# Patient Record
Sex: MALE | Race: BLACK OR AFRICAN AMERICAN | Employment: UNEMPLOYED | URBAN - METROPOLITAN AREA
[De-identification: names, ages, dates, MRNs, and addresses within clinical notes are randomized per-mention and may not be internally consistent; named-entity substitution may affect disease eponyms.]

---

## 2017-08-08 ENCOUNTER — TELEPHONE (OUTPATIENT)
Dept: FAMILY MEDICINE CLINIC | Facility: CLINIC | Age: 47
End: 2017-08-08

## 2017-08-08 DIAGNOSIS — Z13.0 SCREENING FOR ENDOCRINE, METABOLIC AND IMMUNITY DISORDER: ICD-10-CM

## 2017-08-08 DIAGNOSIS — Z13.29 SCREENING FOR ENDOCRINE, METABOLIC AND IMMUNITY DISORDER: ICD-10-CM

## 2017-08-08 DIAGNOSIS — Z13.220 SCREENING, LIPID: ICD-10-CM

## 2017-08-08 DIAGNOSIS — Z12.5 SCREENING PSA (PROSTATE SPECIFIC ANTIGEN): ICD-10-CM

## 2017-08-08 DIAGNOSIS — Z13.228 SCREENING FOR ENDOCRINE, METABOLIC AND IMMUNITY DISORDER: ICD-10-CM

## 2017-08-08 DIAGNOSIS — Z13.0 SCREENING, ANEMIA, DEFICIENCY, IRON: Primary | ICD-10-CM

## 2017-08-09 LAB
ABSOLUTE BASOPHILS: 28 CELLS/UL (ref 0–200)
ABSOLUTE EOSINOPHILS: 71 CELLS/UL (ref 15–500)
ABSOLUTE LYMPHOCYTES: 2204 CELLS/UL (ref 850–3900)
ABSOLUTE MONOCYTES: 353 CELLS/UL (ref 200–950)
ABSOLUTE NEUTROPHILS: 2045 CELLS/UL (ref 1500–7800)
ALBUMIN/GLOBULIN RATIO: 1.4 (CALC) (ref 1–2.5)
ALBUMIN: 4.1 G/DL (ref 3.6–5.1)
ALKALINE PHOSPHATASE: 56 U/L (ref 40–115)
ALT: 12 U/L (ref 9–46)
AST: 13 U/L (ref 10–40)
BASOPHILS: 0.6 %
BILIRUBIN, TOTAL: 1 MG/DL (ref 0.2–1.2)
BUN: 20 MG/DL (ref 7–25)
CALCIUM: 9.7 MG/DL (ref 8.6–10.3)
CARBON DIOXIDE: 25 MMOL/L (ref 20–31)
CHLORIDE: 106 MMOL/L (ref 98–110)
CHOL/HDLC RATIO: 3.4 (CALC)
CHOLESTEROL, TOTAL: 225 MG/DL (ref 125–200)
CREATININE: 1.11 MG/DL (ref 0.6–1.35)
EGFR IF AFRICN AM: 91 ML/MIN/1.73M2
EGFR IF NONAFRICN AM: 79 ML/MIN/1.73M2
EOSINOPHILS: 1.5 %
GLOBULIN: 2.9 G/DL (CALC) (ref 1.9–3.7)
GLUCOSE: 91 MG/DL (ref 65–99)
HDL CHOLESTEROL: 67 MG/DL
HEMATOCRIT: 41.1 % (ref 38.5–50)
HEMOGLOBIN: 13.8 G/DL (ref 13.2–17.1)
LDL-CHOLESTEROL: 142 MG/DL (CALC)
LYMPHOCYTES: 46.9 %
MCH: 30.7 PG (ref 27–33)
MCHC: 33.6 G/DL (ref 32–36)
MCV: 91.5 FL (ref 80–100)
MONOCYTES: 7.5 %
MPV: 11.2 FL (ref 7.5–12.5)
NEUTROPHILS: 43.5 %
NON-HDL CHOLESTEROL: 158 MG/DL (CALC)
PLATELET COUNT: 195 THOUSAND/UL (ref 140–400)
POTASSIUM: 4.1 MMOL/L (ref 3.5–5.3)
PROTEIN, TOTAL: 7 G/DL (ref 6.1–8.1)
PSA, TOTAL: 5.5 NG/ML
RDW: 11.7 % (ref 11–15)
RED BLOOD CELL COUNT: 4.49 MILLION/UL (ref 4.2–5.8)
SODIUM: 141 MMOL/L (ref 135–146)
TRIGLYCERIDES: 78 MG/DL
WHITE BLOOD CELL COUNT: 4.7 THOUSAND/UL (ref 3.8–10.8)

## 2017-08-11 ENCOUNTER — OFFICE VISIT (OUTPATIENT)
Dept: FAMILY MEDICINE CLINIC | Facility: CLINIC | Age: 47
End: 2017-08-11

## 2017-08-11 VITALS
BODY MASS INDEX: 26.06 KG/M2 | DIASTOLIC BLOOD PRESSURE: 78 MMHG | SYSTOLIC BLOOD PRESSURE: 128 MMHG | HEIGHT: 67.5 IN | HEART RATE: 60 BPM | WEIGHT: 168 LBS | TEMPERATURE: 99 F

## 2017-08-11 DIAGNOSIS — Z00.00 ANNUAL PHYSICAL EXAM: Primary | ICD-10-CM

## 2017-08-11 DIAGNOSIS — R97.20 ELEVATED PSA, LESS THAN 10 NG/ML: ICD-10-CM

## 2017-08-11 DIAGNOSIS — E78.00 PURE HYPERCHOLESTEROLEMIA: ICD-10-CM

## 2017-08-11 PROCEDURE — 99396 PREV VISIT EST AGE 40-64: CPT | Performed by: FAMILY MEDICINE

## 2017-08-11 NOTE — PROGRESS NOTES
No chief complaint on file. HPI:   Bianka Griffin is a 52year old male who presents for a complete physical exam. No concerns today. On and off bloating. Last colonoscopy:  n/a  Last PSA:  Getting checked. Sees urology. PSA stable in the 5s. mouth daily.  Disp:  Rfl:    hydrocortisone (PROCTOCREAM HC) 2.5 % Rectal Cream Apply twice daily for seven days then BID PRN Disp: 30 g Rfl: 0      Past Medical History:   Diagnosis Date   • Rectal bleed       Past Surgical History:  No date: COLONOSCOPY normal, atraumatic, no cyanosis or edema  Pulses: 2+ and symmetric  Neurologic: Alert and oriented X 3, normal strength and tone. Normal symmetric reflexes.  Normal coordination and gait      ASSESSMENT AND PLAN:     Michael Huddleston was seen in the office

## 2017-09-20 ENCOUNTER — OFFICE VISIT (OUTPATIENT)
Dept: FAMILY MEDICINE CLINIC | Facility: CLINIC | Age: 47
End: 2017-09-20

## 2017-09-20 VITALS
BODY MASS INDEX: 25.91 KG/M2 | WEIGHT: 167 LBS | RESPIRATION RATE: 12 BRPM | HEIGHT: 67.5 IN | DIASTOLIC BLOOD PRESSURE: 72 MMHG | HEART RATE: 68 BPM | TEMPERATURE: 99 F | SYSTOLIC BLOOD PRESSURE: 112 MMHG

## 2017-09-20 DIAGNOSIS — R14.0 ABDOMINAL BLOATING: Primary | ICD-10-CM

## 2017-09-20 PROCEDURE — 99213 OFFICE O/P EST LOW 20 MIN: CPT | Performed by: FAMILY MEDICINE

## 2017-09-20 NOTE — PROGRESS NOTES
Patient presents with:  Bloating: x 1 month      HPI:   Delia Rick is a 52year old male who presents to the office for abdominal bloating. This started about a month and a half ago. No clear cause. HE has cut down his powdered protein.   Changed

## 2019-11-22 ENCOUNTER — OFFICE VISIT (OUTPATIENT)
Dept: FAMILY MEDICINE CLINIC | Facility: CLINIC | Age: 49
End: 2019-11-22

## 2019-11-22 VITALS
DIASTOLIC BLOOD PRESSURE: 70 MMHG | HEIGHT: 67.5 IN | BODY MASS INDEX: 25.01 KG/M2 | RESPIRATION RATE: 12 BRPM | TEMPERATURE: 99 F | SYSTOLIC BLOOD PRESSURE: 114 MMHG | WEIGHT: 161.25 LBS | HEART RATE: 100 BPM

## 2019-11-22 DIAGNOSIS — F32.A DEPRESSION, UNSPECIFIED DEPRESSION TYPE: Primary | ICD-10-CM

## 2019-11-22 DIAGNOSIS — G47.9 DIFFICULTY SLEEPING: ICD-10-CM

## 2019-11-22 PROCEDURE — 99213 OFFICE O/P EST LOW 20 MIN: CPT | Performed by: NURSE PRACTITIONER

## 2019-11-22 RX ORDER — ESCITALOPRAM OXALATE 10 MG/1
10 TABLET ORAL DAILY
Qty: 30 TABLET | Refills: 1 | Status: SHIPPED | OUTPATIENT
Start: 2019-11-22 | End: 2020-02-18

## 2019-11-22 NOTE — PROGRESS NOTES
Patient presents with:  Sleep Problem: x 3 weeks, not sleeping at night, stress       HPI:  Presents with approx 3 week history of feeling depressed and having difficulty sleeping.  Stated feels feelings of depression are related to marital stress with wife distress. Effort normal. Breath sounds clear bilaterally. No wheezes, rhonchi or rales   Skin: Skin is warm and dry. No rash noted. No erythema. No pallor. Psych: Sitting calmly in exam room, interacting appropriately with examiner.  Dressed appropriately

## 2020-01-07 ENCOUNTER — TELEPHONE (OUTPATIENT)
Dept: FAMILY MEDICINE CLINIC | Facility: CLINIC | Age: 50
End: 2020-01-07

## 2020-01-07 DIAGNOSIS — Z13.220 LIPID SCREENING: Primary | ICD-10-CM

## 2020-01-07 DIAGNOSIS — Z13.0 SCREENING FOR DEFICIENCY ANEMIA: ICD-10-CM

## 2020-01-07 NOTE — TELEPHONE ENCOUNTER
Please enter lab orders for the patient's upcoming physical appointment. Physical scheduled:    Your appointments     Date & Time Appointment Department [facility])    Jan 14, 2020  2:00 PM CST Physical - Established with Uriel Hamilton MD [facility]

## 2020-01-11 LAB
ABSOLUTE BASOPHILS: 19 CELLS/UL (ref 0–200)
ABSOLUTE EOSINOPHILS: 53 CELLS/UL (ref 15–500)
ABSOLUTE LYMPHOCYTES: 1421 CELLS/UL (ref 850–3900)
ABSOLUTE MONOCYTES: 322 CELLS/UL (ref 200–950)
ABSOLUTE NEUTROPHILS: 2986 CELLS/UL (ref 1500–7800)
ALBUMIN/GLOBULIN RATIO: 1.4 (CALC) (ref 1–2.5)
ALBUMIN: 3.8 G/DL (ref 3.6–5.1)
ALKALINE PHOSPHATASE: 54 U/L (ref 40–115)
ALT: 9 U/L (ref 9–46)
AST: 10 U/L (ref 10–40)
BASOPHILS: 0.4 %
BILIRUBIN, TOTAL: 0.8 MG/DL (ref 0.2–1.2)
BUN: 16 MG/DL (ref 7–25)
CALCIUM: 9.4 MG/DL (ref 8.6–10.3)
CARBON DIOXIDE: 27 MMOL/L (ref 20–32)
CHLORIDE: 107 MMOL/L (ref 98–110)
CHOL/HDLC RATIO: 3.7 (CALC)
CHOLESTEROL, TOTAL: 212 MG/DL
CREATININE: 1.02 MG/DL (ref 0.6–1.35)
EGFR IF AFRICN AM: 100 ML/MIN/1.73M2
EGFR IF NONAFRICN AM: 86 ML/MIN/1.73M2
EOSINOPHILS: 1.1 %
GLOBULIN: 2.8 G/DL (CALC) (ref 1.9–3.7)
GLUCOSE: 89 MG/DL (ref 65–99)
HDL CHOLESTEROL: 57 MG/DL
HEMATOCRIT: 43.4 % (ref 38.5–50)
HEMOGLOBIN: 14.5 G/DL (ref 13.2–17.1)
LDL-CHOLESTEROL: 139 MG/DL (CALC)
LYMPHOCYTES: 29.6 %
MCH: 30.9 PG (ref 27–33)
MCHC: 33.4 G/DL (ref 32–36)
MCV: 92.3 FL (ref 80–100)
MONOCYTES: 6.7 %
MPV: 10.7 FL (ref 7.5–12.5)
NEUTROPHILS: 62.2 %
NON-HDL CHOLESTEROL: 155 MG/DL (CALC)
PLATELET COUNT: 274 THOUSAND/UL (ref 140–400)
POTASSIUM: 4.4 MMOL/L (ref 3.5–5.3)
PROTEIN, TOTAL: 6.6 G/DL (ref 6.1–8.1)
RDW: 11.6 % (ref 11–15)
RED BLOOD CELL COUNT: 4.7 MILLION/UL (ref 4.2–5.8)
SODIUM: 141 MMOL/L (ref 135–146)
TRIGLYCERIDES: 67 MG/DL
WHITE BLOOD CELL COUNT: 4.8 THOUSAND/UL (ref 3.8–10.8)

## 2020-01-14 ENCOUNTER — OFFICE VISIT (OUTPATIENT)
Dept: FAMILY MEDICINE CLINIC | Facility: CLINIC | Age: 50
End: 2020-01-14

## 2020-01-14 VITALS
SYSTOLIC BLOOD PRESSURE: 116 MMHG | WEIGHT: 153.81 LBS | HEART RATE: 68 BPM | TEMPERATURE: 99 F | DIASTOLIC BLOOD PRESSURE: 74 MMHG | BODY MASS INDEX: 23.86 KG/M2 | HEIGHT: 67.5 IN | RESPIRATION RATE: 14 BRPM

## 2020-01-14 DIAGNOSIS — Z00.00 ANNUAL PHYSICAL EXAM: Primary | ICD-10-CM

## 2020-01-14 DIAGNOSIS — R97.20 ELEVATED PSA, LESS THAN 10 NG/ML: ICD-10-CM

## 2020-01-14 DIAGNOSIS — Z12.11 COLON CANCER SCREENING: ICD-10-CM

## 2020-01-14 PROCEDURE — 99396 PREV VISIT EST AGE 40-64: CPT | Performed by: FAMILY MEDICINE

## 2020-01-14 NOTE — PROGRESS NOTES
Patient presents with: Well Adult: Annual physical     HPI:   Evette Holley is a 52year old male who presents for a complete physical exam.     Last colonoscopy:  Due at 48. Last PSA:  High in past.  Good urine flow. No night time syptoms.   No fam ENDOSCOPY   • HEMORRHOIDECTOMY N/A 3/28/2014    Performed by Adonis Lennon MD at [facility] MAIN OR      Family History   Problem Relation Age of Onset   • Hypertension Mother       Social History:  Social History    Tobacco Use      Smoking status: Never Smoker separation right now. Mild depression, coping ok so far. Offered counseling, therapy. Will consider. Theoplis Elder has been a good distraction for him of recent  Stay active, healthy diet. Recheck PSA.      2. Elevated PSA, less than 10 ng/ml  High three year

## 2020-02-18 ENCOUNTER — OFFICE VISIT (OUTPATIENT)
Dept: FAMILY MEDICINE CLINIC | Facility: CLINIC | Age: 50
End: 2020-02-18

## 2020-02-18 VITALS
WEIGHT: 154.5 LBS | SYSTOLIC BLOOD PRESSURE: 130 MMHG | BODY MASS INDEX: 23.97 KG/M2 | RESPIRATION RATE: 14 BRPM | HEART RATE: 84 BPM | DIASTOLIC BLOOD PRESSURE: 90 MMHG | TEMPERATURE: 98 F | HEIGHT: 67.32 IN

## 2020-02-18 DIAGNOSIS — R03.0 ELEVATED BP WITHOUT DIAGNOSIS OF HYPERTENSION: ICD-10-CM

## 2020-02-18 DIAGNOSIS — R42 DIZZINESS: Primary | ICD-10-CM

## 2020-02-18 DIAGNOSIS — R00.2 PALPITATIONS: ICD-10-CM

## 2020-02-18 DIAGNOSIS — F41.9 ANXIETY: ICD-10-CM

## 2020-02-18 PROCEDURE — 99214 OFFICE O/P EST MOD 30 MIN: CPT | Performed by: NURSE PRACTITIONER

## 2020-02-18 PROCEDURE — 93000 ELECTROCARDIOGRAM COMPLETE: CPT | Performed by: NURSE PRACTITIONER

## 2020-02-18 NOTE — PROGRESS NOTES
Patient presents with:  Dizziness: x 2 weeks, high bp at [employer], headachs, heart racing       HPI:  Presents with 2 week history of intermittent dizziness both at rest and with activity with occasional associated headache.  Stated has needed to sit down intact. Hand grasp (=) bilat. No pronator drift. No nystagmus noted. Follows commands appropriately. Speech fluid. Neck: Normal range of motion. Neck supple. No JVD/bruits  Cardiovascular: Normal rate, regular rhythm. No murmur. No S3/S4.    Pulmonary/Jacqueline other anxiety control techniques such as routine exercise- which he may resume at low/moderate intensity (ER if symptoms occur during exercise), meditation, guided imagery, yoga, writing in journal, talking to support network (family/friends), professional

## 2020-02-28 LAB
% FREE PSA: 15 % (CALC)
FREE PSA: 1 NG/ML
TOTAL PSA: 6.8 NG/ML

## 2020-06-23 ENCOUNTER — TELEPHONE (OUTPATIENT)
Dept: FAMILY MEDICINE CLINIC | Facility: CLINIC | Age: 50
End: 2020-06-23

## 2020-06-23 DIAGNOSIS — R97.20 ELEVATED PSA, LESS THAN 10 NG/ML: Primary | ICD-10-CM

## 2020-06-23 NOTE — TELEPHONE ENCOUNTER
Patient scheduled follow up appointment for 6/29 for follow up on PSA. Patient had psa levels checked 2/27 does he need another PSA lab order prior  To visit?  If so, please send order to [facility]. Appointment is scheduled for 6/29

## 2020-06-29 ENCOUNTER — OFFICE VISIT (OUTPATIENT)
Dept: FAMILY MEDICINE CLINIC | Facility: CLINIC | Age: 50
End: 2020-06-29

## 2020-06-29 VITALS
TEMPERATURE: 99 F | HEART RATE: 80 BPM | RESPIRATION RATE: 16 BRPM | WEIGHT: 160 LBS | HEIGHT: 67 IN | DIASTOLIC BLOOD PRESSURE: 70 MMHG | BODY MASS INDEX: 25.11 KG/M2 | SYSTOLIC BLOOD PRESSURE: 120 MMHG

## 2020-06-29 DIAGNOSIS — R97.20 ELEVATED PSA, LESS THAN 10 NG/ML: Primary | ICD-10-CM

## 2020-06-29 PROCEDURE — 99213 OFFICE O/P EST LOW 20 MIN: CPT | Performed by: FAMILY MEDICINE

## 2020-06-29 NOTE — PROGRESS NOTES
Patient presents with:  elevated psa: Review Test Results from 06-     HPI:   Wei Solis is a 48year old male who presents to the office for discussion of PSA. PSA kinsey. No urinary symptoms.       Has seen Dr. Hayde Butler in the past.  MRI in 2

## 2020-07-06 PROCEDURE — 88344 IMHCHEM/IMCYTCHM EA MLT ANTB: CPT | Performed by: UROLOGY

## 2020-07-06 PROCEDURE — 88305 TISSUE EXAM BY PATHOLOGIST: CPT | Performed by: UROLOGY

## 2020-08-21 ENCOUNTER — TELEPHONE (OUTPATIENT)
Dept: FAMILY MEDICINE CLINIC | Facility: CLINIC | Age: 50
End: 2020-08-21

## 2020-08-21 DIAGNOSIS — Z20.822 ENCOUNTER FOR SCREENING LABORATORY TESTING FOR COVID-19 VIRUS IN ASYMPTOMATIC PATIENT: Primary | ICD-10-CM

## 2020-08-21 NOTE — TELEPHONE ENCOUNTER
Patient is leaving for [place] on Wednesday and needs a covid test done and back before leaving

## 2020-08-21 NOTE — TELEPHONE ENCOUNTER
Patient is going to [facility] [place] and he needs a Covid test done before he enters the country.  No SX.

## 2020-08-22 ENCOUNTER — LAB ENCOUNTER (OUTPATIENT)
Dept: LAB | Facility: HOSPITAL | Age: 50
End: 2020-08-22
Attending: FAMILY MEDICINE
Payer: COMMERCIAL

## 2020-08-22 DIAGNOSIS — Z20.822 ENCOUNTER FOR SCREENING LABORATORY TESTING FOR COVID-19 VIRUS IN ASYMPTOMATIC PATIENT: ICD-10-CM

## 2020-08-26 LAB — SARS-COV-2 BY PCR: NOT DETECTED

## 2021-06-02 ENCOUNTER — TELEPHONE (OUTPATIENT)
Dept: FAMILY MEDICINE CLINIC | Facility: CLINIC | Age: 51
End: 2021-06-02

## 2021-06-02 DIAGNOSIS — R97.20 ELEVATED PSA, LESS THAN 10 NG/ML: Primary | ICD-10-CM

## 2021-06-02 DIAGNOSIS — E78.00 PURE HYPERCHOLESTEROLEMIA: ICD-10-CM

## 2021-06-02 NOTE — TELEPHONE ENCOUNTER
Please enter lab orders for the patient's upcoming physical appointment. Physical scheduled:    Your appointments     Date & Time Appointment Department [facility])    Jun 14, 2021 11:30 AM CDT Physical - Established with Nunu Flores  [facility]

## 2021-06-14 ENCOUNTER — OFFICE VISIT (OUTPATIENT)
Dept: FAMILY MEDICINE CLINIC | Facility: CLINIC | Age: 51
End: 2021-06-14
Payer: MEDICAID

## 2021-06-14 VITALS
BODY MASS INDEX: 26.37 KG/M2 | SYSTOLIC BLOOD PRESSURE: 120 MMHG | DIASTOLIC BLOOD PRESSURE: 68 MMHG | WEIGHT: 170 LBS | HEART RATE: 84 BPM | TEMPERATURE: 97 F | HEIGHT: 67.5 IN

## 2021-06-14 DIAGNOSIS — R97.20 ELEVATED PSA, LESS THAN 10 NG/ML: ICD-10-CM

## 2021-06-14 DIAGNOSIS — Z00.00 ANNUAL PHYSICAL EXAM: Primary | ICD-10-CM

## 2021-06-14 DIAGNOSIS — E78.00 PURE HYPERCHOLESTEROLEMIA: ICD-10-CM

## 2021-06-14 PROCEDURE — 99396 PREV VISIT EST AGE 40-64: CPT | Performed by: FAMILY MEDICINE

## 2021-06-14 PROCEDURE — 3078F DIAST BP <80 MM HG: CPT | Performed by: FAMILY MEDICINE

## 2021-06-14 PROCEDURE — 3008F BODY MASS INDEX DOCD: CPT | Performed by: FAMILY MEDICINE

## 2021-06-14 PROCEDURE — 3074F SYST BP LT 130 MM HG: CPT | Performed by: FAMILY MEDICINE

## 2021-06-14 NOTE — PROGRESS NOTES
Patient presents with:  Physical: Annual      HPI:   Mónica Pinon is a 46year old male who presents for a complete physical exam.     Last colonoscopy:  3/24/2014. Repeat 10 yrs  Last PSA:  PSA rising, but actually down a bit from previous testing. HEMORRHOIDECTOMY  3/28/2014    Procedure: Ana Smallwood;  Surgeon: Fiorella Lee MD;  Location: [address] [place] [address]   • OTHER SURGICAL HISTORY  07/06/2020    prostate biopsy, Dr Manolo Huff      Family History   Problem Relation Age of Onset   • Hypertension Mother       So PSA.    Colonoscopy up-to-date  Not interested in vaccinations at this time    2. Pure hypercholesterolemia  Cholesterol with quite a jump since the last check a few years ago.   Patient believes this can be attributed to his diet which has been higher in m

## 2021-11-29 ENCOUNTER — TELEPHONE (OUTPATIENT)
Dept: FAMILY MEDICINE CLINIC | Facility: CLINIC | Age: 51
End: 2021-11-29

## 2021-11-29 NOTE — TELEPHONE ENCOUNTER
Pt has scheduled an appt with Claudette Richards on 12/7/21 for back pain and also requesting a call back from a nurse

## 2021-11-30 NOTE — TELEPHONE ENCOUNTER
Called and talked to patient he has having the same pain that he had several years ago made appointmetn for tomorrow.

## 2021-12-01 ENCOUNTER — OFFICE VISIT (OUTPATIENT)
Dept: FAMILY MEDICINE CLINIC | Facility: CLINIC | Age: 51
End: 2021-12-01
Payer: MEDICAID

## 2021-12-01 VITALS
SYSTOLIC BLOOD PRESSURE: 130 MMHG | WEIGHT: 170.63 LBS | DIASTOLIC BLOOD PRESSURE: 82 MMHG | HEIGHT: 67.25 IN | TEMPERATURE: 98 F | BODY MASS INDEX: 26.47 KG/M2 | RESPIRATION RATE: 16 BRPM | HEART RATE: 68 BPM

## 2021-12-01 DIAGNOSIS — S39.012A STRAIN OF LUMBAR REGION, INITIAL ENCOUNTER: Primary | ICD-10-CM

## 2021-12-01 PROCEDURE — 3079F DIAST BP 80-89 MM HG: CPT | Performed by: PHYSICIAN ASSISTANT

## 2021-12-01 PROCEDURE — 3075F SYST BP GE 130 - 139MM HG: CPT | Performed by: PHYSICIAN ASSISTANT

## 2021-12-01 PROCEDURE — 3008F BODY MASS INDEX DOCD: CPT | Performed by: PHYSICIAN ASSISTANT

## 2021-12-01 PROCEDURE — 99213 OFFICE O/P EST LOW 20 MIN: CPT | Performed by: PHYSICIAN ASSISTANT

## 2021-12-01 RX ORDER — CYCLOBENZAPRINE HCL 5 MG
5 TABLET ORAL NIGHTLY PRN
Qty: 20 TABLET | Refills: 0 | Status: SHIPPED | OUTPATIENT
Start: 2021-12-01

## 2021-12-01 RX ORDER — METHYLPREDNISOLONE 4 MG/1
TABLET ORAL
Qty: 21 EACH | Refills: 0 | Status: SHIPPED | OUTPATIENT
Start: 2021-12-01

## 2021-12-06 NOTE — PROGRESS NOTES
Patient presents with:  Back Pain: Low back pain x1 week, pt feels a burning sensation       HISTORY OF PRESENT ILLNESS  Westramos Song is a 46year old male who presents for evaluation of low back pain.  He has had this happen in the past but typ Normal gait. SENSATION Normal  PULSES 2+ posterior tibial pulses bilaterally. ASSESSMENT/ PLAN  (S39.012A) Strain of lumbar region, initial encounter  (primary encounter diagnosis)  Plan: Will have him try medrol and flexeril.  Discussed possible side

## 2021-12-20 ENCOUNTER — TELEPHONE (OUTPATIENT)
Dept: FAMILY MEDICINE CLINIC | Facility: CLINIC | Age: 51
End: 2021-12-20

## 2021-12-20 NOTE — TELEPHONE ENCOUNTER
Pt no showed his appt for today.  Pt has been informed of $40.00 and did reschedule to 1/3/22 with Dr Aiden Sanchez

## 2022-02-11 ENCOUNTER — OFFICE VISIT (OUTPATIENT)
Dept: FAMILY MEDICINE CLINIC | Facility: CLINIC | Age: 52
End: 2022-02-11
Payer: MEDICAID

## 2022-02-11 VITALS
BODY MASS INDEX: 25.97 KG/M2 | RESPIRATION RATE: 16 BRPM | HEART RATE: 64 BPM | HEIGHT: 68 IN | WEIGHT: 171.38 LBS | DIASTOLIC BLOOD PRESSURE: 74 MMHG | TEMPERATURE: 98 F | SYSTOLIC BLOOD PRESSURE: 112 MMHG | OXYGEN SATURATION: 99 %

## 2022-02-11 DIAGNOSIS — R37 SEXUAL DYSFUNCTION: ICD-10-CM

## 2022-02-11 DIAGNOSIS — R97.20 ELEVATED PSA, LESS THAN 10 NG/ML: Primary | ICD-10-CM

## 2022-02-11 PROCEDURE — 3078F DIAST BP <80 MM HG: CPT | Performed by: FAMILY MEDICINE

## 2022-02-11 PROCEDURE — 99213 OFFICE O/P EST LOW 20 MIN: CPT | Performed by: FAMILY MEDICINE

## 2022-02-11 PROCEDURE — 3074F SYST BP LT 130 MM HG: CPT | Performed by: FAMILY MEDICINE

## 2022-02-11 PROCEDURE — 3008F BODY MASS INDEX DOCD: CPT | Performed by: FAMILY MEDICINE

## 2022-07-11 ENCOUNTER — TELEPHONE (OUTPATIENT)
Dept: FAMILY MEDICINE CLINIC | Facility: CLINIC | Age: 52
End: 2022-07-11

## 2022-07-11 NOTE — TELEPHONE ENCOUNTER
Symptoms started Saturday and had positive home covid test Sunday. Currently has a cough, fever that mostly occurs at night (has not checked temp) and night sweats. Denies any breathing issues. Reviewed recommended quarantine time. Rest and push fluids. Discussed OTC meds. Taking Advil and allergy meds. May purchase a pulse oximeter. To call this office with questions or worsening. To UC or ER for severe or new symptoms.

## 2022-07-11 NOTE — TELEPHONE ENCOUNTER
Pt has tested positive for Covid on Saturday. He has sweating,chest congestion,headache and fever.  Please advise

## 2022-12-10 ENCOUNTER — TELEPHONE (OUTPATIENT)
Dept: FAMILY MEDICINE CLINIC | Facility: CLINIC | Age: 52
End: 2022-12-10

## 2022-12-10 DIAGNOSIS — E78.00 PURE HYPERCHOLESTEROLEMIA: Primary | ICD-10-CM

## 2022-12-10 DIAGNOSIS — R97.20 ELEVATED PSA, LESS THAN 10 NG/ML: ICD-10-CM

## 2022-12-10 NOTE — TELEPHONE ENCOUNTER
Please enter lab orders for the patient's upcoming physical appointment. Physical scheduled: Your appointments     Date & Time Appointment Department [facility])    Jan 24, 2023  3:00 PM CST Physical - Established with Gil Cotto MD [facility] [address], [address], [place]  ([place]ovany Zuleta)            [address] [place] [id number] [place] [address] [phone]         Preferred lab: [facility]     The patient has been notified to complete fasting labs prior to their physical appointment.

## 2022-12-21 ENCOUNTER — APPOINTMENT (OUTPATIENT)
Dept: CT IMAGING | Facility: HOSPITAL | Age: 52
End: 2022-12-21
Attending: INTERNAL MEDICINE
Payer: MEDICAID

## 2022-12-21 ENCOUNTER — HOSPITAL ENCOUNTER (EMERGENCY)
Facility: HOSPITAL | Age: 52
Discharge: HOME | End: 2022-12-21
Attending: EMERGENCY MEDICINE
Payer: MEDICAID

## 2022-12-21 ENCOUNTER — APPOINTMENT (OUTPATIENT)
Dept: CV DIAGNOSTICS | Facility: HOSPITAL | Age: 52
End: 2022-12-21
Attending: INTERNAL MEDICINE
Payer: MEDICAID

## 2022-12-21 ENCOUNTER — APPOINTMENT (OUTPATIENT)
Dept: GENERAL RADIOLOGY | Facility: HOSPITAL | Age: 52
End: 2022-12-21
Attending: EMERGENCY MEDICINE
Payer: MEDICAID

## 2022-12-21 VITALS
TEMPERATURE: 98 F | WEIGHT: 165 LBS | SYSTOLIC BLOOD PRESSURE: 128 MMHG | OXYGEN SATURATION: 99 % | DIASTOLIC BLOOD PRESSURE: 78 MMHG | HEART RATE: 75 BPM | RESPIRATION RATE: 14 BRPM | BODY MASS INDEX: 25.9 KG/M2 | HEIGHT: 67 IN

## 2022-12-21 DIAGNOSIS — E78.00 HYPERCHOLESTEROLEMIA: ICD-10-CM

## 2022-12-21 DIAGNOSIS — R07.89 CHEST PAIN, ATYPICAL: Primary | ICD-10-CM

## 2022-12-21 LAB
ALBUMIN SERPL-MCNC: 3.5 G/DL (ref 3.4–5)
ALBUMIN/GLOB SERPL: 1 {RATIO} (ref 1–2)
ALP LIVER SERPL-CCNC: 55 U/L
ALT SERPL-CCNC: 18 U/L
ANION GAP SERPL CALC-SCNC: 0 MMOL/L (ref 0–18)
APTT PPP: 25.6 SECONDS (ref 23.3–35.6)
AST SERPL-CCNC: 10 U/L (ref 15–37)
ATRIAL RATE: 61 BPM
BASOPHILS # BLD AUTO: 0.03 X10(3) UL (ref 0–0.2)
BASOPHILS NFR BLD AUTO: 0.6 %
BILIRUB SERPL-MCNC: 0.6 MG/DL (ref 0.1–2)
BILIRUB UR QL STRIP.AUTO: NEGATIVE
BUN BLD-MCNC: 15 MG/DL (ref 7–18)
CALCIUM BLD-MCNC: 8.8 MG/DL (ref 8.5–10.1)
CHLORIDE SERPL-SCNC: 111 MMOL/L (ref 98–112)
CHOLEST SERPL-MCNC: 231 MG/DL (ref ?–200)
CLARITY UR REFRACT.AUTO: CLEAR
CO2 SERPL-SCNC: 30 MMOL/L (ref 21–32)
COLOR UR AUTO: YELLOW
CREAT BLD-MCNC: 1.2 MG/DL
D DIMER PPP FEU-MCNC: <0.27 UG/ML FEU (ref ?–0.52)
EOSINOPHIL # BLD AUTO: 0.07 X10(3) UL (ref 0–0.7)
EOSINOPHIL NFR BLD AUTO: 1.4 %
ERYTHROCYTE [DISTWIDTH] IN BLOOD BY AUTOMATED COUNT: 11.9 %
GFR SERPLBLD BASED ON 1.73 SQ M-ARVRAT: 73 ML/MIN/1.73M2 (ref 60–?)
GLOBULIN PLAS-MCNC: 3.5 G/DL (ref 2.8–4.4)
GLUCOSE BLD-MCNC: 89 MG/DL (ref 70–99)
GLUCOSE UR STRIP.AUTO-MCNC: NEGATIVE MG/DL
HCT VFR BLD AUTO: 41.8 %
HDLC SERPL-MCNC: 59 MG/DL (ref 40–59)
HGB BLD-MCNC: 14.2 G/DL
IMM GRANULOCYTES # BLD AUTO: 0.01 X10(3) UL (ref 0–1)
IMM GRANULOCYTES NFR BLD: 0.2 %
INR BLD: 1.04 (ref 0.85–1.16)
KETONES UR STRIP.AUTO-MCNC: NEGATIVE MG/DL
LDLC SERPL CALC-MCNC: 164 MG/DL (ref ?–100)
LEUKOCYTE ESTERASE UR QL STRIP.AUTO: NEGATIVE
LYMPHOCYTES # BLD AUTO: 2.17 X10(3) UL (ref 1–4)
LYMPHOCYTES NFR BLD AUTO: 44.1 %
MCH RBC QN AUTO: 31.3 PG (ref 26–34)
MCHC RBC AUTO-ENTMCNC: 34 G/DL (ref 31–37)
MCV RBC AUTO: 92.1 FL
MONOCYTES # BLD AUTO: 0.34 X10(3) UL (ref 0.1–1)
MONOCYTES NFR BLD AUTO: 6.9 %
NEUTROPHILS # BLD AUTO: 2.3 X10 (3) UL (ref 1.5–7.7)
NEUTROPHILS # BLD AUTO: 2.3 X10(3) UL (ref 1.5–7.7)
NEUTROPHILS NFR BLD AUTO: 46.8 %
NITRITE UR QL STRIP.AUTO: NEGATIVE
NONHDLC SERPL-MCNC: 172 MG/DL (ref ?–130)
OSMOLALITY SERPL CALC.SUM OF ELEC: 292 MOSM/KG (ref 275–295)
P AXIS: 62 DEGREES
P-R INTERVAL: 180 MS
PH UR STRIP.AUTO: 6 [PH] (ref 5–8)
PLATELET # BLD AUTO: 216 10(3)UL (ref 150–450)
POTASSIUM SERPL-SCNC: 3.8 MMOL/L (ref 3.5–5.1)
PROT SERPL-MCNC: 7 G/DL (ref 6.4–8.2)
PROT UR STRIP.AUTO-MCNC: NEGATIVE MG/DL
PROTHROMBIN TIME: 13.6 SECONDS (ref 11.6–14.8)
Q-T INTERVAL: 424 MS
QRS DURATION: 90 MS
QTC CALCULATION (BEZET): 426 MS
R AXIS: 67 DEGREES
RBC # BLD AUTO: 4.54 X10(6)UL
SARS-COV-2 RNA RESP QL NAA+PROBE: NOT DETECTED
SODIUM SERPL-SCNC: 141 MMOL/L (ref 136–145)
SP GR UR STRIP.AUTO: >=1.03 (ref 1–1.03)
T AXIS: 34 DEGREES
TRIGL SERPL-MCNC: 51 MG/DL (ref 30–149)
TROPONIN I HIGH SENSITIVITY: 5 NG/L
TROPONIN I HIGH SENSITIVITY: 5 NG/L
UROBILINOGEN UR STRIP.AUTO-MCNC: 0.2 MG/DL
VENTRICULAR RATE: 61 BPM
VLDLC SERPL CALC-MCNC: 10 MG/DL (ref 0–30)
WBC # BLD AUTO: 4.9 X10(3) UL (ref 4–11)

## 2022-12-21 PROCEDURE — 85610 PROTHROMBIN TIME: CPT | Performed by: EMERGENCY MEDICINE

## 2022-12-21 PROCEDURE — 75574 CT ANGIO HRT W/3D IMAGE: CPT | Performed by: INTERNAL MEDICINE

## 2022-12-21 PROCEDURE — 71045 X-RAY EXAM CHEST 1 VIEW: CPT | Performed by: EMERGENCY MEDICINE

## 2022-12-21 PROCEDURE — 84484 ASSAY OF TROPONIN QUANT: CPT | Performed by: EMERGENCY MEDICINE

## 2022-12-21 PROCEDURE — 93306 TTE W/DOPPLER COMPLETE: CPT | Performed by: INTERNAL MEDICINE

## 2022-12-21 PROCEDURE — 93010 ELECTROCARDIOGRAM REPORT: CPT

## 2022-12-21 PROCEDURE — 93005 ELECTROCARDIOGRAM TRACING: CPT

## 2022-12-21 PROCEDURE — 80053 COMPREHEN METABOLIC PANEL: CPT

## 2022-12-21 PROCEDURE — 99285 EMERGENCY DEPT VISIT HI MDM: CPT

## 2022-12-21 PROCEDURE — 96374 THER/PROPH/DIAG INJ IV PUSH: CPT

## 2022-12-21 PROCEDURE — 85730 THROMBOPLASTIN TIME PARTIAL: CPT | Performed by: EMERGENCY MEDICINE

## 2022-12-21 PROCEDURE — 81015 MICROSCOPIC EXAM OF URINE: CPT | Performed by: EMERGENCY MEDICINE

## 2022-12-21 PROCEDURE — 96361 HYDRATE IV INFUSION ADD-ON: CPT

## 2022-12-21 PROCEDURE — 85379 FIBRIN DEGRADATION QUANT: CPT | Performed by: EMERGENCY MEDICINE

## 2022-12-21 PROCEDURE — 80053 COMPREHEN METABOLIC PANEL: CPT | Performed by: EMERGENCY MEDICINE

## 2022-12-21 PROCEDURE — 85025 COMPLETE CBC W/AUTO DIFF WBC: CPT | Performed by: EMERGENCY MEDICINE

## 2022-12-21 PROCEDURE — 85025 COMPLETE CBC W/AUTO DIFF WBC: CPT

## 2022-12-21 PROCEDURE — 80061 LIPID PANEL: CPT | Performed by: INTERNAL MEDICINE

## 2022-12-21 PROCEDURE — 81001 URINALYSIS AUTO W/SCOPE: CPT | Performed by: EMERGENCY MEDICINE

## 2022-12-21 RX ORDER — NITROGLYCERIN 0.4 MG/1
0.4 TABLET SUBLINGUAL EVERY 5 MIN PRN
Status: DISCONTINUED | OUTPATIENT
Start: 2022-12-21 | End: 2022-12-21

## 2022-12-21 RX ORDER — METOPROLOL TARTRATE 50 MG/1
100 TABLET, FILM COATED ORAL ONCE AS NEEDED
Status: DISCONTINUED | OUTPATIENT
Start: 2022-12-21 | End: 2022-12-21

## 2022-12-21 RX ORDER — NITROGLYCERIN 0.4 MG/1
0.4 TABLET SUBLINGUAL ONCE
OUTPATIENT
Start: 2022-12-21 | End: 2022-12-21

## 2022-12-21 RX ORDER — NITROGLYCERIN 0.4 MG/1
0.4 TABLET SUBLINGUAL ONCE
Status: COMPLETED | OUTPATIENT
Start: 2022-12-21 | End: 2022-12-21

## 2022-12-21 RX ORDER — NITROGLYCERIN 0.4 MG/1
TABLET SUBLINGUAL
Status: COMPLETED
Start: 2022-12-21 | End: 2022-12-21

## 2022-12-21 RX ORDER — SODIUM CHLORIDE 9 MG/ML
INJECTION, SOLUTION INTRAVENOUS CONTINUOUS
Status: DISCONTINUED | OUTPATIENT
Start: 2022-12-21 | End: 2022-12-21

## 2022-12-21 RX ORDER — NITROGLYCERIN 0.4 MG/1
TABLET SUBLINGUAL
Status: DISCONTINUED
Start: 2022-12-21 | End: 2022-12-21 | Stop reason: WASHOUT

## 2022-12-21 RX ORDER — KETOROLAC TROMETHAMINE 30 MG/ML
30 INJECTION, SOLUTION INTRAMUSCULAR; INTRAVENOUS ONCE
Status: COMPLETED | OUTPATIENT
Start: 2022-12-21 | End: 2022-12-21

## 2022-12-21 RX ORDER — METOPROLOL TARTRATE 5 MG/5ML
INJECTION INTRAVENOUS
Status: DISCONTINUED
Start: 2022-12-21 | End: 2022-12-21 | Stop reason: WASHOUT

## 2022-12-21 RX ORDER — DILTIAZEM HYDROCHLORIDE 5 MG/ML
5 INJECTION INTRAVENOUS SEE ADMIN INSTRUCTIONS
OUTPATIENT
Start: 2022-12-21

## 2022-12-21 RX ORDER — METOPROLOL TARTRATE 5 MG/5ML
5 INJECTION INTRAVENOUS SEE ADMIN INSTRUCTIONS
OUTPATIENT
Start: 2022-12-21

## 2022-12-21 RX ORDER — METOPROLOL TARTRATE 50 MG/1
50 TABLET, FILM COATED ORAL ONCE AS NEEDED
Status: DISCONTINUED | OUTPATIENT
Start: 2022-12-21 | End: 2022-12-21

## 2022-12-21 NOTE — DISCHARGE INSTRUCTIONS
Follow-up with [address] [place] [address] cardiovascular [place] regarding your elevated LDL. Continue to monitor your symptoms. Follow-up with Dr. Echeverria Reason for general health issues.   Return for any problems

## 2022-12-21 NOTE — ED INITIAL ASSESSMENT (HPI)
Pt arrives to ed w complaints of stephanie and chest pain for the last two weeks. Pt reports pain across his whole chest. Pt denies fever, cough, and N/V/D. Pt arrives w no resp distress.
Initial (On Arrival)

## 2022-12-21 NOTE — IMAGING NOTE
Pt arrives to room 2 at 1140. Working with [address] IV established by ED staff to [facility] with 20 gauge angiocath. Pt denies long acting nitrates. Pt positioned on CT table comfortably. Procedure explained and questions answered. O2 applied via NC at deniesL. VSS as noted in flowsheet. GFR = 73   imaging started at 1150. Bolus timing with HR in mid 50's. Dr Fareed Louis notified ot HR variable from mid 52's to mid 63's and no additional meds needed. 0.9NS flush followed by  contrast at 1200. contrast =100  0.9NS = 100  Average HR = 61    Pt tolerated procedure without complication. Denies s/sx of contrast reaction. IV to saline lock. Pt A/O x 4 and denies pain. Ambulatory and in stable condition. Dc'd to ED with CT trransporter and ER nurse made aware of VS and fluids given.

## 2024-05-09 ENCOUNTER — TELEPHONE (OUTPATIENT)
Dept: FAMILY MEDICINE CLINIC | Facility: CLINIC | Age: 54
End: 2024-05-09

## 2024-05-09 ENCOUNTER — OFFICE VISIT (OUTPATIENT)
Dept: FAMILY MEDICINE CLINIC | Facility: CLINIC | Age: 54
End: 2024-05-09
Payer: MEDICAID

## 2024-05-09 VITALS
RESPIRATION RATE: 16 BRPM | HEIGHT: 67.5 IN | DIASTOLIC BLOOD PRESSURE: 86 MMHG | WEIGHT: 170.38 LBS | TEMPERATURE: 98 F | BODY MASS INDEX: 26.43 KG/M2 | SYSTOLIC BLOOD PRESSURE: 136 MMHG | HEART RATE: 64 BPM

## 2024-05-09 DIAGNOSIS — Z12.11 COLON CANCER SCREENING: ICD-10-CM

## 2024-05-09 DIAGNOSIS — Z00.00 LABORATORY EXAM ORDERED AS PART OF ROUTINE GENERAL MEDICAL EXAMINATION: Primary | ICD-10-CM

## 2024-05-09 DIAGNOSIS — R97.20 ELEVATED PSA, LESS THAN 10 NG/ML: ICD-10-CM

## 2024-05-09 DIAGNOSIS — E78.00 PURE HYPERCHOLESTEROLEMIA: ICD-10-CM

## 2024-05-09 DIAGNOSIS — M54.32 LEFT SIDED SCIATICA: Primary | ICD-10-CM

## 2024-05-09 PROCEDURE — 99213 OFFICE O/P EST LOW 20 MIN: CPT | Performed by: FAMILY MEDICINE

## 2024-05-09 RX ORDER — PREDNISONE 20 MG/1
40 TABLET ORAL DAILY
Qty: 10 TABLET | Refills: 0 | Status: SHIPPED | OUTPATIENT
Start: 2024-05-09 | End: 2024-05-14

## 2024-05-09 NOTE — PROGRESS NOTES
Chief Complaint   Patient presents with    Low Back Pain     Low back pain beginning 4 weeks ago radiating to the L leg    Blood Pressure     138/98 Regular [id number]/96 large cuff      HPI:   Chadd Ryder is a 54 year old male who presents to the office for back pain eval.   Back pain started after helping a friend move.  This was a month ago.  Was doing some home back exercises, but over time getting worse.   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   +radiating down the L leg.        REVIEW OF SYSTEMS:   Pertinent items are noted in HPI.  EXAM:   /86   Pulse 64   Temp 98.1 °F (36.7 °C) (Oral)   Resp 16   Ht 5' 7.5\" (1.715 m)   Wt 170 lb 6.4 oz (77.3 kg)   BMI 26.29 kg/m²     General appearance - alert, well appearing, and in no distress  Chest - clear to auscultation, no wheezes, rales or rhonchi, symmetric air entry  Heart - normal rate, regular rhythm, normal S1, S2, no murmurs, rubs, clicks or gallops  Back exam - full range of motion, no tenderness, palpable spasm or pain on motion, positive straight-leg raise on the L side  Neurological - alert, oriented, normal speech, no focal findings or movement disorder noted, DTR's normal and symmetric, motor and sensory grossly normal bilaterally  Musculoskeletal - no joint tenderness, deformity or swelling, normal muscle strength  Extremities - peripheral pulses normal, no pedal edema, no clubbing or cyanosis  ASSESSMENT AND PLAN:     Chadd Ryder was seen in the office today:  had concerns including Low Back Pain (Low back pain beginning 4 weeks ago radiating to the L leg) and Blood Pressure (138/98 Regular [id number]/96 large cuff).    1. Left sided sciatica  Ongoing despite injury being a month ago  Using stretches at home, but pains seem to continue  Need formal PT  Will also start steroids to help with inflammation  - predniSONE 20 MG Oral Tab; Take 2 tablets (40 mg total) by mouth daily for 5 days.   Dispense: 10 tablet; Refill: 0  - Physical Therapy Referral - [facility]    2. Colon cancer screening  Due again  Referral placed.   - GASTRO - INTERNAL        Pepito Harris M.D.   [facility]  05/09/24

## 2024-05-09 NOTE — TELEPHONE ENCOUNTER
Please enter lab orders for the patient's upcoming physical appointment.     Physical scheduled:   Your appointments       Date & Time Appointment Department ([facility])    Jun 17, 2024 8:00 AM CDT Physical - Established with Pepito Harris MD [facility] ([facility])              [facility]  [address]  [facility] [address]  [phone]           Preferred lab: [facility] ([facility])     The patient has been notified to complete fasting labs prior to their physical appointment.

## 2024-06-13 ENCOUNTER — LAB ENCOUNTER (OUTPATIENT)
Dept: LAB | Age: 54
End: 2024-06-13
Attending: FAMILY MEDICINE
Payer: MEDICAID

## 2024-06-13 DIAGNOSIS — E78.00 PURE HYPERCHOLESTEROLEMIA: ICD-10-CM

## 2024-06-13 DIAGNOSIS — Z00.00 LABORATORY EXAM ORDERED AS PART OF ROUTINE GENERAL MEDICAL EXAMINATION: ICD-10-CM

## 2024-06-13 DIAGNOSIS — R97.20 ELEVATED PSA, LESS THAN 10 NG/ML: ICD-10-CM

## 2024-06-13 LAB
ALBUMIN SERPL-MCNC: 3.6 G/DL (ref 3.4–5)
ALBUMIN/GLOB SERPL: 1 {RATIO} (ref 1–2)
ALP LIVER SERPL-CCNC: 53 U/L
ALT SERPL-CCNC: 24 U/L
ANION GAP SERPL CALC-SCNC: 7 MMOL/L (ref 0–18)
AST SERPL-CCNC: 25 U/L (ref 15–37)
BASOPHILS # BLD AUTO: 0.03 X10(3) UL (ref 0–0.2)
BASOPHILS NFR BLD AUTO: 0.6 %
BILIRUB SERPL-MCNC: 1 MG/DL (ref 0.1–2)
BUN BLD-MCNC: 18 MG/DL (ref 9–23)
CALCIUM BLD-MCNC: 9 MG/DL (ref 8.5–10.1)
CHLORIDE SERPL-SCNC: 113 MMOL/L (ref 98–112)
CHOLEST SERPL-MCNC: 212 MG/DL (ref ?–200)
CO2 SERPL-SCNC: 24 MMOL/L (ref 21–32)
CREAT BLD-MCNC: 1.11 MG/DL
EGFRCR SERPLBLD CKD-EPI 2021: 79 ML/MIN/1.73M2 (ref 60–?)
EOSINOPHIL # BLD AUTO: 0.05 X10(3) UL (ref 0–0.7)
EOSINOPHIL NFR BLD AUTO: 1 %
ERYTHROCYTE [DISTWIDTH] IN BLOOD BY AUTOMATED COUNT: 12.7 %
EST. AVERAGE GLUCOSE BLD GHB EST-MCNC: 80 MG/DL (ref 68–126)
FASTING PATIENT LIPID ANSWER: YES
FASTING STATUS PATIENT QL REPORTED: YES
GLOBULIN PLAS-MCNC: 3.6 G/DL (ref 2.8–4.4)
GLUCOSE BLD-MCNC: 91 MG/DL (ref 70–99)
HBA1C MFR BLD: 4.4 % (ref ?–5.7)
HCT VFR BLD AUTO: 39.8 %
HDLC SERPL-MCNC: 59 MG/DL (ref 40–59)
HGB BLD-MCNC: 13.3 G/DL
IMM GRANULOCYTES # BLD AUTO: 0.01 X10(3) UL (ref 0–1)
IMM GRANULOCYTES NFR BLD: 0.2 %
LDLC SERPL CALC-MCNC: 142 MG/DL (ref ?–100)
LYMPHOCYTES # BLD AUTO: 2.52 X10(3) UL (ref 1–4)
LYMPHOCYTES NFR BLD AUTO: 48 %
MCH RBC QN AUTO: 30.8 PG (ref 26–34)
MCHC RBC AUTO-ENTMCNC: 33.4 G/DL (ref 31–37)
MCV RBC AUTO: 92.1 FL
MONOCYTES # BLD AUTO: 0.4 X10(3) UL (ref 0.1–1)
MONOCYTES NFR BLD AUTO: 7.6 %
NEUTROPHILS # BLD AUTO: 2.24 X10 (3) UL (ref 1.5–7.7)
NEUTROPHILS # BLD AUTO: 2.24 X10(3) UL (ref 1.5–7.7)
NEUTROPHILS NFR BLD AUTO: 42.6 %
NONHDLC SERPL-MCNC: 153 MG/DL (ref ?–130)
OSMOLALITY SERPL CALC.SUM OF ELEC: 299 MOSM/KG (ref 275–295)
PLATELET # BLD AUTO: 225 10(3)UL (ref 150–450)
POTASSIUM SERPL-SCNC: 3.7 MMOL/L (ref 3.5–5.1)
PROT SERPL-MCNC: 7.2 G/DL (ref 6.4–8.2)
PSA FREE MFR SERPL: 9 %
PSA FREE SERPL-MCNC: 1.08 NG/ML
PSA SERPL-MCNC: 11.7 NG/ML (ref ?–4)
RBC # BLD AUTO: 4.32 X10(6)UL
SODIUM SERPL-SCNC: 144 MMOL/L (ref 136–145)
TRIGL SERPL-MCNC: 62 MG/DL (ref 30–149)
TSI SER-ACNC: 1.17 MIU/ML (ref 0.36–3.74)
VLDLC SERPL CALC-MCNC: 11 MG/DL (ref 0–30)
WBC # BLD AUTO: 5.3 X10(3) UL (ref 4–11)

## 2024-06-13 PROCEDURE — 36415 COLL VENOUS BLD VENIPUNCTURE: CPT

## 2024-06-13 PROCEDURE — 84153 ASSAY OF PSA TOTAL: CPT

## 2024-06-13 PROCEDURE — 84443 ASSAY THYROID STIM HORMONE: CPT

## 2024-06-13 PROCEDURE — 80061 LIPID PANEL: CPT

## 2024-06-13 PROCEDURE — 85025 COMPLETE CBC W/AUTO DIFF WBC: CPT

## 2024-06-13 PROCEDURE — 83036 HEMOGLOBIN GLYCOSYLATED A1C: CPT

## 2024-06-13 PROCEDURE — 84154 ASSAY OF PSA FREE: CPT

## 2024-06-13 PROCEDURE — 80053 COMPREHEN METABOLIC PANEL: CPT

## 2025-06-24 ENCOUNTER — PATIENT OUTREACH (OUTPATIENT)
Dept: FAMILY MEDICINE CLINIC | Facility: CLINIC | Age: 55
End: 2025-06-24